# Patient Record
Sex: FEMALE | Race: WHITE | NOT HISPANIC OR LATINO | URBAN - METROPOLITAN AREA
[De-identification: names, ages, dates, MRNs, and addresses within clinical notes are randomized per-mention and may not be internally consistent; named-entity substitution may affect disease eponyms.]

---

## 2018-01-25 ENCOUNTER — EMERGENCY (EMERGENCY)
Facility: HOSPITAL | Age: 38
LOS: 1 days | Discharge: HOME | End: 2018-01-25
Attending: EMERGENCY MEDICINE | Admitting: EMERGENCY MEDICINE
Payer: MEDICAID

## 2018-01-25 VITALS
TEMPERATURE: 98 F | OXYGEN SATURATION: 98 % | SYSTOLIC BLOOD PRESSURE: 135 MMHG | HEART RATE: 77 BPM | RESPIRATION RATE: 18 BRPM | DIASTOLIC BLOOD PRESSURE: 88 MMHG

## 2018-01-25 DIAGNOSIS — R21 RASH AND OTHER NONSPECIFIC SKIN ERUPTION: ICD-10-CM

## 2018-01-25 DIAGNOSIS — L01.00 IMPETIGO, UNSPECIFIED: ICD-10-CM

## 2018-01-25 PROCEDURE — 99283 EMERGENCY DEPT VISIT LOW MDM: CPT | Mod: 25

## 2018-01-25 RX ORDER — HYDROXYZINE HCL 10 MG
2 TABLET ORAL
Qty: 30 | Refills: 0
Start: 2018-01-25 | End: 2018-01-29

## 2018-01-25 RX ORDER — VALACYCLOVIR 500 MG/1
1000 TABLET, FILM COATED ORAL ONCE
Refills: 0 | Status: COMPLETED | OUTPATIENT
Start: 2018-01-25 | End: 2018-01-25

## 2018-01-25 RX ORDER — VALACYCLOVIR 500 MG/1
1 TABLET, FILM COATED ORAL
Qty: 42 | Refills: 0
Start: 2018-01-25 | End: 2018-02-07

## 2018-01-25 RX ORDER — MUPIROCIN 20 MG/G
1 OINTMENT TOPICAL
Qty: 22 | Refills: 1
Start: 2018-01-25 | End: 2018-02-21

## 2018-01-25 RX ORDER — HYDROXYZINE HCL 10 MG
50 TABLET ORAL ONCE
Refills: 0 | Status: COMPLETED | OUTPATIENT
Start: 2018-01-25 | End: 2018-01-25

## 2018-01-25 RX ADMIN — Medication 1 TABLET(S): at 01:47

## 2018-01-25 RX ADMIN — Medication 60 MILLIGRAM(S): at 01:47

## 2018-01-25 RX ADMIN — Medication 50 MILLIGRAM(S): at 01:48

## 2018-01-25 RX ADMIN — VALACYCLOVIR 1000 MILLIGRAM(S): 500 TABLET, FILM COATED ORAL at 01:47

## 2018-01-25 NOTE — ED PROVIDER NOTE - CONSTITUTIONAL, MLM
Well appearing, well nourished, awake, alert, oriented to person, place, time/situation and itching face. normal...

## 2018-01-25 NOTE — ED PROVIDER NOTE - MEDICAL DECISION MAKING DETAILS
Patient presenting with V1 Zoster that is impetiginized. Will Rx Valtrex and Bactrim/bactroban. No visual sx.

## 2018-01-25 NOTE — ED PROVIDER NOTE - OBJECTIVE STATEMENT
38 F with painful itchy rash to R forehead and scalp. x 2d. Had a bad URI/poss Flu last week. No f/c. No eye sx.

## 2018-01-25 NOTE — ED ADULT TRIAGE NOTE - CHIEF COMPLAINT QUOTE
Pt. c/o rash to face and scalp. Rash is raised and scabbed in some spots. Pt. reports rash is very itchy.

## 2021-06-30 ENCOUNTER — EMERGENCY (EMERGENCY)
Facility: HOSPITAL | Age: 41
LOS: 1 days | Discharge: HOME | End: 2021-06-30
Attending: EMERGENCY MEDICINE | Admitting: EMERGENCY MEDICINE
Payer: COMMERCIAL

## 2021-06-30 VITALS
WEIGHT: 167.99 LBS | DIASTOLIC BLOOD PRESSURE: 71 MMHG | SYSTOLIC BLOOD PRESSURE: 103 MMHG | RESPIRATION RATE: 18 BRPM | OXYGEN SATURATION: 97 % | HEART RATE: 86 BPM | TEMPERATURE: 99 F | HEIGHT: 67 IN

## 2021-06-30 DIAGNOSIS — Z20.822 CONTACT WITH AND (SUSPECTED) EXPOSURE TO COVID-19: ICD-10-CM

## 2021-06-30 DIAGNOSIS — R07.0 PAIN IN THROAT: ICD-10-CM

## 2021-06-30 DIAGNOSIS — R05 COUGH: ICD-10-CM

## 2021-06-30 PROCEDURE — 70360 X-RAY EXAM OF NECK: CPT | Mod: 26

## 2021-06-30 PROCEDURE — 71046 X-RAY EXAM CHEST 2 VIEWS: CPT | Mod: 26

## 2021-06-30 PROCEDURE — 99284 EMERGENCY DEPT VISIT MOD MDM: CPT

## 2021-06-30 NOTE — ED ADULT NURSE NOTE - PAIN: BODY LOCATION
PRESCRIPTION REFILL POLICY [facility] [[email or web address]] Neurology [facility] Statement to Patients April 1, 2014 In an effort to ensure the large volume of patient prescription refills is processed in the most efficient and expeditious manner, we are asking our patients to assist us by calling your Pharmacy for all prescription refills, this will include also your  Mail Order Pharmacy. The pharmacy will contact our office electronically to continue the refill process. Please do not wait until the last minute to call your pharmacy. We need at least 48 hours (2days) to fill prescriptions. We also encourage you to call your pharmacy before going to  your prescription to make sure it is ready. With regard to controlled substance prescription refill requests (narcotic refills) that need to be picked up at our office, we ask your cooperation by providing us with at least 72 hours (3days) notice that you will need a refill. We will not refill narcotic prescription refill requests after 4:00pm on any weekday, Monday through Thursday, or after 2:00pm on Fridays, or on the weekends. We encourage everyone to explore another way of getting your prescription refill request processed using [email or web address], our patient web portal through our electronic medical record system. [email or web address] is an efficient and effective way to communicate your medication request directly to the office and  downloadable as an ivelisse on your smart phone . [email or web address] also features a review functionality that allows you to view your medication list as well as leave messages for your physician. Are you ready to get connected? If so please review the attatched instructions or speak to any of our staff to get you set up right away! Thank you so much for your cooperation. Should you have any questions please contact our Practice Administrator. The Physicians and Staff,  [facility] [email or web address]] Neurology [facility] sore throat

## 2021-06-30 NOTE — ED ADULT NURSE NOTE - OBJECTIVE STATEMENT
Pt came in c/o of cough x4 days. PT reports "hars cough that is making my throat hurt." Pt denies known sick contact. Denies fever, chills, sob, cp, n/v/d, pmh. A&Ox3 speaking in full sentences

## 2021-06-30 NOTE — ED ADULT TRIAGE NOTE - CHIEF COMPLAINT QUOTE
"Renee been violently coughing for 3 days and now my throat hurts" went to pcp given inhaler and looked in throat given a numbing med to swallow with nil relief , nil pmmhx, nil meds, covid -ve today , took advil with in the hour, nil fevers

## 2021-06-30 NOTE — ED ADULT TRIAGE NOTE - BMI (KG/M2)
26.3 34 yo F with pmh of GERD c/o intermittent epigastric pain x 5 days.  Associated with elevated LFTs yesterday. Currently pain free. Abd soft, nt, nd. r/o biliary colic, low suspicion for acute cholecystitis. 36 yo F with pmh of GERD c/o intermittent epigastric pain x 5 days.  Associated with elevated LFTs yesterday. Currently pain free. Abd soft, nt, nd. r/o biliary colic, r/o gallstones, r/o pancreatitis, low suspicion for acute cholecystitis.

## 2021-07-01 LAB
FLUAV H1 2009 PAND RNA SPEC QL NAA+PROBE: SIGNIFICANT CHANGE UP
FLUAV H1 RNA SPEC QL NAA+PROBE: SIGNIFICANT CHANGE UP
FLUAV H3 RNA SPEC QL NAA+PROBE: SIGNIFICANT CHANGE UP
FLUAV SUBTYP SPEC NAA+PROBE: SIGNIFICANT CHANGE UP
FLUBV RNA SPEC QL NAA+PROBE: SIGNIFICANT CHANGE UP
RAPID RVP RESULT: SIGNIFICANT CHANGE UP
S PYO AG SPEC QL IA: NEGATIVE — SIGNIFICANT CHANGE UP
SARS-COV-2 RNA SPEC QL NAA+PROBE: SIGNIFICANT CHANGE UP

## 2021-07-01 RX ORDER — HYDROCODONE BITARTRATE AND HOMATROPINE METHYLBROMIDE 5; 1.5 MG/5ML; MG/5ML
5 SOLUTION ORAL
Qty: 75 | Refills: 0
Start: 2021-07-01 | End: 2021-07-05

## 2021-07-01 NOTE — ED PROVIDER NOTE - PHYSICAL EXAMINATION
Physical Exam  GEN: Awake, alert, non-toxic appearing, NCAT  EYES: full EOMI, perrl  ENT: oropharynx patent w/o swelling/erythema/exudate/petechiae, midline uvula w/o swelling, no stridor, no cridoid tenderness, no neck musculature tenderness, bl TM clear w/o effusion, bl canal w/o swelling/erythema, no mastoid tenderness  HEAD: atraumatic  NECK: FROM neck w/o pain, nontender neck musculature, no crepitus, supple,   CV: rrr  RESP: cta bl, no tachypnea, no hypoxia, no resp distress,  MSK: card x 4, no chest wall crepitus  SKIN: no facial rash

## 2021-07-01 NOTE — ED PROVIDER NOTE - PATIENT PORTAL LINK FT
You can access the FollowMyHealth Patient Portal offered by [facility] by registering at the following website: [email or web address]. By joining [employer]’s FollowMyHealth portal, you will also be able to view your health information using other applications (apps) compatible with our system.

## 2021-07-01 NOTE — ED PROVIDER NOTE - PROGRESS NOTE DETAILS
d/w pt regarding d-dimer for low risk PE evaluation, pt does not want to draw blood, understands rationale of checking d-dimer and the purpose of the blood test, understands risks of undiagnosed PE, which can lead to death, and/or permanent/temporary disability.  pt wants to go home and see if she improves, will return if not better.  strict return precautions discussed.  ao x 3, clear and coherent speech, appears to have capacity to make decisions.

## 2021-07-01 NOTE — ED PROVIDER NOTE - NSFOLLOWUPINSTRUCTIONS_ED_ALL_ED_FT
Follow up with your primary care doctor   Take tessalon perles for cough  Take hycodan for severe cough  Hycodan can cause drowsiness  Return immediately for any new or worsening symptoms or any new concerns

## 2021-07-01 NOTE — ED PROVIDER NOTE - CLINICAL SUMMARY MEDICAL DECISION MAKING FREE TEXT BOX
nontoxic appearing pt w/ cough x 3 days, preceded by "tickle" in throat, no neck pain, no neck tenderness, no oropharyngeal swelling/erythema/exudate, will check strep throat, rvp, xray chest and throat given cough+pain, no crepitus on exam, antitussive prn, low suspicion for rpa or deep space infection,

## 2021-07-01 NOTE — ED PROVIDER NOTE - OBJECTIVE STATEMENT
41 yof pw cough x 3-4 days.  pt initially noted a "tickle" in her throat last Monday, then felt like she was fatigued/malaise, then cough began.  no unilateral leg pain/swelling, no immobilization/travel/surg/trauma, no exogenous hormone use, no hx of PE/DVT, no FHx of PE/DVT.  reports throat pain today after coughing, no pain w/ neck movement, also R ear pain after coughing.  no cp/pleurisy.

## 2021-07-02 LAB
CULTURE RESULTS: SIGNIFICANT CHANGE UP
SPECIMEN SOURCE: SIGNIFICANT CHANGE UP

## 2021-07-13 ENCOUNTER — EMERGENCY (EMERGENCY)
Facility: HOSPITAL | Age: 41
LOS: 1 days | Discharge: HOME | End: 2021-07-13
Admitting: EMERGENCY MEDICINE
Payer: COMMERCIAL

## 2021-07-13 VITALS
RESPIRATION RATE: 16 BRPM | HEIGHT: 68 IN | SYSTOLIC BLOOD PRESSURE: 125 MMHG | OXYGEN SATURATION: 96 % | HEART RATE: 110 BPM | WEIGHT: 179.9 LBS | TEMPERATURE: 98 F | DIASTOLIC BLOOD PRESSURE: 85 MMHG

## 2021-07-13 VITALS
DIASTOLIC BLOOD PRESSURE: 88 MMHG | RESPIRATION RATE: 18 BRPM | TEMPERATURE: 99 F | HEART RATE: 103 BPM | SYSTOLIC BLOOD PRESSURE: 134 MMHG | OXYGEN SATURATION: 97 %

## 2021-07-13 DIAGNOSIS — J35.1 HYPERTROPHY OF TONSILS: ICD-10-CM

## 2021-07-13 DIAGNOSIS — Z20.822 CONTACT WITH AND (SUSPECTED) EXPOSURE TO COVID-19: ICD-10-CM

## 2021-07-13 DIAGNOSIS — J06.9 ACUTE UPPER RESPIRATORY INFECTION, UNSPECIFIED: ICD-10-CM

## 2021-07-13 DIAGNOSIS — R50.9 FEVER, UNSPECIFIED: ICD-10-CM

## 2021-07-13 DIAGNOSIS — R59.9 ENLARGED LYMPH NODES, UNSPECIFIED: ICD-10-CM

## 2021-07-13 DIAGNOSIS — R05 COUGH: ICD-10-CM

## 2021-07-13 LAB
ALBUMIN SERPL ELPH-MCNC: 3.6 G/DL — SIGNIFICANT CHANGE UP (ref 3.4–5)
ALP SERPL-CCNC: 148 U/L — HIGH (ref 40–120)
ALT FLD-CCNC: 76 U/L — HIGH (ref 12–42)
ANION GAP SERPL CALC-SCNC: 8 MMOL/L — LOW (ref 9–16)
AST SERPL-CCNC: 55 U/L — HIGH (ref 15–37)
BASOPHILS # BLD AUTO: 0.04 K/UL — SIGNIFICANT CHANGE UP (ref 0–0.2)
BASOPHILS NFR BLD AUTO: 0.3 % — SIGNIFICANT CHANGE UP (ref 0–2)
BILIRUB SERPL-MCNC: 0.4 MG/DL — SIGNIFICANT CHANGE UP (ref 0.2–1.2)
BUN SERPL-MCNC: 8 MG/DL — SIGNIFICANT CHANGE UP (ref 7–23)
CALCIUM SERPL-MCNC: 9 MG/DL — SIGNIFICANT CHANGE UP (ref 8.5–10.5)
CHLORIDE SERPL-SCNC: 106 MMOL/L — SIGNIFICANT CHANGE UP (ref 96–108)
CO2 SERPL-SCNC: 25 MMOL/L — SIGNIFICANT CHANGE UP (ref 22–31)
CREAT SERPL-MCNC: 0.83 MG/DL — SIGNIFICANT CHANGE UP (ref 0.5–1.3)
D DIMER BLD IA.RAPID-MCNC: <187 NG/ML DDU — SIGNIFICANT CHANGE UP
EOSINOPHIL # BLD AUTO: 0.06 K/UL — SIGNIFICANT CHANGE UP (ref 0–0.5)
EOSINOPHIL NFR BLD AUTO: 0.5 % — SIGNIFICANT CHANGE UP (ref 0–6)
FLUAV H1 2009 PAND RNA SPEC QL NAA+PROBE: SIGNIFICANT CHANGE UP
FLUAV H1 RNA SPEC QL NAA+PROBE: SIGNIFICANT CHANGE UP
FLUAV H3 RNA SPEC QL NAA+PROBE: SIGNIFICANT CHANGE UP
FLUAV SUBTYP SPEC NAA+PROBE: SIGNIFICANT CHANGE UP
FLUBV RNA SPEC QL NAA+PROBE: SIGNIFICANT CHANGE UP
GLUCOSE SERPL-MCNC: 113 MG/DL — HIGH (ref 70–99)
HCG SERPL-ACNC: <1 MIU/ML — SIGNIFICANT CHANGE UP
HCT VFR BLD CALC: 41.6 % — SIGNIFICANT CHANGE UP (ref 34.5–45)
HGB BLD-MCNC: 14.1 G/DL — SIGNIFICANT CHANGE UP (ref 11.5–15.5)
IMM GRANULOCYTES NFR BLD AUTO: 0.4 % — SIGNIFICANT CHANGE UP (ref 0–1.5)
LACTATE SERPL-SCNC: 0.7 MMOL/L — SIGNIFICANT CHANGE UP (ref 0.4–2)
LYMPHOCYTES # BLD AUTO: 1.41 K/UL — SIGNIFICANT CHANGE UP (ref 1–3.3)
LYMPHOCYTES # BLD AUTO: 12.1 % — LOW (ref 13–44)
MAGNESIUM SERPL-MCNC: 2 MG/DL — SIGNIFICANT CHANGE UP (ref 1.6–2.6)
MCHC RBC-ENTMCNC: 31.5 PG — SIGNIFICANT CHANGE UP (ref 27–34)
MCHC RBC-ENTMCNC: 33.9 GM/DL — SIGNIFICANT CHANGE UP (ref 32–36)
MCV RBC AUTO: 92.9 FL — SIGNIFICANT CHANGE UP (ref 80–100)
MONOCYTES # BLD AUTO: 1.03 K/UL — HIGH (ref 0–0.9)
MONOCYTES NFR BLD AUTO: 8.8 % — SIGNIFICANT CHANGE UP (ref 2–14)
NEUTROPHILS # BLD AUTO: 9.08 K/UL — HIGH (ref 1.8–7.4)
NEUTROPHILS NFR BLD AUTO: 77.9 % — HIGH (ref 43–77)
NRBC # BLD: 0 /100 WBCS — SIGNIFICANT CHANGE UP (ref 0–0)
PHOSPHATE SERPL-MCNC: 2.6 MG/DL — SIGNIFICANT CHANGE UP (ref 2.5–4.5)
PLATELET # BLD AUTO: 350 K/UL — SIGNIFICANT CHANGE UP (ref 150–400)
POTASSIUM SERPL-MCNC: 4.5 MMOL/L — SIGNIFICANT CHANGE UP (ref 3.5–5.3)
POTASSIUM SERPL-SCNC: 4.5 MMOL/L — SIGNIFICANT CHANGE UP (ref 3.5–5.3)
PROT SERPL-MCNC: 7.5 G/DL — SIGNIFICANT CHANGE UP (ref 6.4–8.2)
RAPID RVP RESULT: SIGNIFICANT CHANGE UP
RBC # BLD: 4.48 M/UL — SIGNIFICANT CHANGE UP (ref 3.8–5.2)
RBC # FLD: 13.2 % — SIGNIFICANT CHANGE UP (ref 10.3–14.5)
S PYO AG SPEC QL IA: NEGATIVE — SIGNIFICANT CHANGE UP
SARS-COV-2 RNA SPEC QL NAA+PROBE: SIGNIFICANT CHANGE UP
SODIUM SERPL-SCNC: 139 MMOL/L — SIGNIFICANT CHANGE UP (ref 132–145)
TROPONIN I SERPL-MCNC: <0.017 NG/ML — LOW (ref 0.02–0.06)
WBC # BLD: 11.67 K/UL — HIGH (ref 3.8–10.5)
WBC # FLD AUTO: 11.67 K/UL — HIGH (ref 3.8–10.5)

## 2021-07-13 PROCEDURE — 99284 EMERGENCY DEPT VISIT MOD MDM: CPT

## 2021-07-13 PROCEDURE — 71046 X-RAY EXAM CHEST 2 VIEWS: CPT | Mod: 26

## 2021-07-13 RX ORDER — SODIUM CHLORIDE 9 MG/ML
1000 INJECTION INTRAMUSCULAR; INTRAVENOUS; SUBCUTANEOUS ONCE
Refills: 0 | Status: COMPLETED | OUTPATIENT
Start: 2021-07-13 | End: 2021-07-13

## 2021-07-13 RX ORDER — DEXAMETHASONE 0.5 MG/5ML
6 ELIXIR ORAL ONCE
Refills: 0 | Status: COMPLETED | OUTPATIENT
Start: 2021-07-13 | End: 2021-07-13

## 2021-07-13 RX ORDER — KETOROLAC TROMETHAMINE 30 MG/ML
15 SYRINGE (ML) INJECTION ONCE
Refills: 0 | Status: DISCONTINUED | OUTPATIENT
Start: 2021-07-13 | End: 2021-07-13

## 2021-07-13 RX ADMIN — Medication 15 MILLIGRAM(S): at 10:03

## 2021-07-13 RX ADMIN — Medication 1 MILLIGRAM(S): at 10:02

## 2021-07-13 RX ADMIN — Medication 6 MILLIGRAM(S): at 10:04

## 2021-07-13 RX ADMIN — SODIUM CHLORIDE 1000 MILLILITER(S): 9 INJECTION INTRAMUSCULAR; INTRAVENOUS; SUBCUTANEOUS at 10:04

## 2021-07-13 NOTE — ED PROVIDER NOTE - WR INTERPRETATION 1
CXR negative - No pneumothorax, No opacities, No free airCXR negative - No infiltrates, No consolidation, No atelectasis seenCXR negative - No tracheal deviation, No pneumothorax, No subdiaphragmatic

## 2021-07-13 NOTE — ED ADULT NURSE NOTE - OBJECTIVE STATEMENT
Pt is a 41y female complaining of sore throat, cough, and back pain x4 weeks. Pt states that she was seen in ED when symptoms started. Pt states this morning she has a fever of 99.3.

## 2021-07-13 NOTE — ED PROVIDER NOTE - ENMT, MLM
Airway patent, +bilateral tonsil edema and exudates, no kissing tonsils, uvula midline, no vesicles. +mild cervical lymphadenopathy. TMs clear bilaterally with normal markings; no erythema, bulging or rupture.

## 2021-07-13 NOTE — ED PROVIDER NOTE - CARE PLAN
Principal Discharge DX:	Fever, unspecified fever cause  Secondary Diagnosis:	Viral upper respiratory tract infection

## 2021-07-13 NOTE — ED PROVIDER NOTE - PATIENT PORTAL LINK FT
You can access the FollowMyHealth Patient Portal offered by [facility] by registering at the following website: [email or web address]. By joining [employer]’s FollowMyHealth portal, you will also be able to view your health information using other applications (apps) compatible with our system.

## 2021-07-13 NOTE — ED PROVIDER NOTE - OBJECTIVE STATEMENT
40 yo F presents to the ED c/o cough, sore throat, low grade fever, body aches and r sided back pain x 2-3 weeks. Pt was seen in the ED when symptoms started; negative strep and negative CXR. Pt was given tessalon perles and Tylenol with codeine for cough with initial improvement over the last week. Pt report worsening increased pain with swallowing with resurgence of low grade fever x 2 days. Pt notes she originally had pain to R ear with associated decreased hearing on the R side; Pt notes R ear symptoms have since improve. Pt denies headache, neck pain, abdominal pain, N/V/D, chest pain. Pt was not treated with antibiotics; Pt is fully vaccinated against COVID and flu.

## 2021-07-13 NOTE — ED ADULT NURSE NOTE - NSIMPLEMENTINTERV_GEN_ALL_ED
Implemented All Universal Safety Interventions:  [place] to call system. Call bell, personal items and telephone within reach. Instruct patient to call for assistance. Room bathroom lighting operational. Non-slip footwear when patient is off stretcher. Physically safe environment: no spills, clutter or unnecessary equipment. Stretcher in lowest position, wheels locked, appropriate side rails in place.

## 2021-07-13 NOTE — ED PROVIDER NOTE - PROGRESS NOTE DETAILS
rechecked pt - discussed all results and plan. symptoms resolved after toradol, fluids, decadron. will d/c. pt has a scheduled flight in 2-3 days. will give a note to not fly given fever./

## 2021-07-13 NOTE — ED PROVIDER NOTE - CLINICAL SUMMARY MEDICAL DECISION MAKING FREE TEXT BOX
pt presents with persistent URI. pt presents with persistent URI with some right sided back pain and low grade fever. tachy on arrival. will obtain labs including cultures, ekg, cxr, d dimer and cardiac enzymes.     labs unremarkable. ekg nonacute. cxr shows no pathology. strep negative. symptoms resolved after toradol, fluids, and decadron. will d/c.

## 2021-07-15 LAB
CULTURE RESULTS: SIGNIFICANT CHANGE UP
SPECIMEN SOURCE: SIGNIFICANT CHANGE UP

## 2021-07-16 LAB — HETEROPH AB TITR SER AGGL: NEGATIVE — SIGNIFICANT CHANGE UP

## 2022-02-03 NOTE — ED ADULT NURSE NOTE - NSFALLRSKHARMRISK_ED_ALL_ED
Post-Op Assessment Note    CV Status:  Stable    Pain management: adequate     Mental Status:  Alert and awake   Hydration Status:  Euvolemic   PONV Controlled:  Controlled   Airway Patency:  Patent      Post Op Vitals Reviewed: Yes      Staff: CRNA         No complications documented      BP   118/75   Temp  98   Pulse  90   Resp   16   SpO2   97% no

## 2022-04-22 ENCOUNTER — EMERGENCY (EMERGENCY)
Facility: HOSPITAL | Age: 42
LOS: 1 days | Discharge: HOME | End: 2022-04-22
Admitting: EMERGENCY MEDICINE
Payer: COMMERCIAL

## 2022-04-22 VITALS
DIASTOLIC BLOOD PRESSURE: 95 MMHG | TEMPERATURE: 98 F | WEIGHT: 149.91 LBS | HEART RATE: 102 BPM | SYSTOLIC BLOOD PRESSURE: 137 MMHG | HEIGHT: 68 IN | OXYGEN SATURATION: 99 % | RESPIRATION RATE: 18 BRPM

## 2022-04-22 DIAGNOSIS — S86.911A STRAIN OF UNSPECIFIED MUSCLE(S) AND TENDON(S) AT LOWER LEG LEVEL, RIGHT LEG, INITIAL ENCOUNTER: ICD-10-CM

## 2022-04-22 DIAGNOSIS — M79.661 PAIN IN RIGHT LOWER LEG: ICD-10-CM
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
## 2022-04-22 PROCEDURE — 99284 EMERGENCY DEPT VISIT MOD MDM: CPT

## 2022-04-22 PROCEDURE — 73590 X-RAY EXAM OF LOWER LEG: CPT | Mod: 26,RT

## 2022-04-22 PROCEDURE — 76882 US LMTD JT/FCL EVL NVASC XTR: CPT | Mod: 26,RT

## 2022-04-22 NOTE — ED PROVIDER NOTE - PATIENT PORTAL LINK FT
You can access the FollowMyHealth Patient Portal offered by [facility] by registering at the following website: [email or web address]. By joining [employer]’s FollowMyHealth portal, you will also be able to view your health information using other applications (apps) compatible with our system.

## 2022-04-22 NOTE — ED ADULT TRIAGE NOTE - CHIEF COMPLAINT QUOTE
pt c/o right ankle/heel pain x 2 days, felt a snap while reaching up for something in a cabinet; is able to walk with heel elevated

## 2022-04-22 NOTE — ED PROVIDER NOTE - PROGRESS NOTE DETAILS
discussed results and plan with pt. cam boot applied with heel lift called Dr. Walker who agrees with plan for cam boot in plantar flexion and will f/u in the office.

## 2022-04-22 NOTE — ED ADULT NURSE NOTE - OBJECTIVE STATEMENT
Pt presents to ED complaining of two days of L calf/ankle pain while on tip toes reaching for something. Pt endorses pain with plantar flexion of ankle denies pain with dorsiflexion. Calf appears mildly swollen sans ecchymosis.

## 2022-04-22 NOTE — ED PROVIDER NOTE - NSFOLLOWUPINSTRUCTIONS_ED_ALL_ED_FT
Medial Head Gastrocnemius Tear       Medial head gastrocnemius tear, also called tennis leg, is an injury to the inner part of the calf muscle. This injury may include overstretching of the muscle or a partial or complete tear. This is a common sports injury. Calf muscle tears usually occur near the back of the knee. This often causes sudden pain and muscle weakness.      What are the causes?    This condition is caused by forceful stretching or strain on the calf muscle. This usually happens when you forcefully push off of your foot. It may also happen if you forcefully straighten your knee while your foot is flat on the ground.      What increases the risk?    The following factors may make you more likely to develop this condition:  •Being male and older than age 40.    •Playing sports that involve:  •Quick increases in speed and changes of direction, such as tennis and soccer.      •Jumping, such as basketball.      •Running, especially uphill or on uneven ground.          What are the signs or symptoms?    Symptoms of this condition include:  •Sudden pain in the back of the leg. You may hear a noise, like a pop or a snap at the time of injury.      •Pain that gets worse when you bring your toes up toward your shin or when you straighten your knee.      •Pain on the inside of your calf, from your knee to your ankle.      •Pain when pressing on your calf muscle.      •Swelling and bruising along your calf and lower leg, down to your ankle. This may worsen for the first 2 days before getting better.      •Not being able to rise up on your toes.      •Difficulty pushing off your foot when walking or using stairs.        How is this diagnosed?    This condition may be diagnosed based on:  •Your symptoms and medical history.      •A physical exam. Your health care provider may be able to feel a lump or a defect in your muscle.      •An MRI or ultrasound to determine the severity and exact location of your injury.        How is this treated?    Treatment for this condition may include:  •Resting the muscle and keeping weight off your leg for several days. During this time, you may use crutches or another walking device.      •Using a splint to keep your ankle or knee in a stable position.      •Wearing a walking boot to decrease the use of your gastrocnemius muscle.      •Using a wedge under your heel to reduce stretching of your healing muscle.      •Wearing a compression sleeve around your calf muscle.      •Icing the muscle.      •Raising (elevating) your leg when resting.      •Taking medicine for pain and swelling, such as NSAIDs or steroids.      •Taking medicine for muscle spasms.      •Doing leg exercises as told by your health care provider or physical therapist.        Follow these instructions at home:    Medicines     •Take over-the-counter and prescription medicines only as told by your health care provider.      •Ask your health care provider if the medicine prescribed to you requires you to avoid driving or using heavy machinery.      •Talk with your health care provider before you take any medicines that contain aspirin. Aspirin increases your risk for bleeding at the injured area.      If you have a splint, boot, or compression sleeve:     •Wear it as told by your health care provider. Remove it only as told by your health care provider.      •Loosen the splint, boot, or sleeve if your toes tingle, become numb, or turn cold and blue.      •Keep the splint, boot, or sleeve clean.    •If the splint, boot, or sleeve is not waterproof:  •Do not let it get wet.      •Cover it with a watertight covering when you take a bath or shower.          Managing pain, stiffness, and swelling    •If directed, put ice on the injured area.  •If you have a removable splint, boot, or sleeve, remove it as told by your health care provider.      •Put ice in a plastic bag.      •Place a towel between your skin and the bag.      •Leave the ice on for 20 minutes, 2–3 times a day.        •Move your toes often to reduce stiffness and swelling.      •Elevate the injured area above the level of your heart while you are sitting or lying down.      Activity     •Return gradually to your normal activities as told by your health care provider. Ask your health care provider what activities are safe for you.      • Do not use the injured limb to support your body weight until your health care provider says that you can. Use crutches as told by your health care provider.      •Do exercises as told by your health care provider.      •Return to sporting activity only as told by your health care provider or physical therapist. Full recovery may take several months.      General instructions     •Ask your health care provider when it is safe to drive.      • Do not use any products that contain nicotine or tobacco, such as cigarettes, e-cigarettes, and chewing tobacco. If you need help quitting, ask your health care provider.      •Keep all follow-up visits as told by your health care provider. This is important.        How is this prevented?    •Warm up and stretch before being active.      •Cool down and stretch after being active.      •Give your body time to rest between periods of activity.      •Make sure to use equipment that fits you.      •Be safe and responsible while being active to avoid falls.    •Maintain physical fitness, including:  •Strength.      •Flexibility.          Contact a health care provider if:    •Your symptoms do not improve with rest and treatment.        Get help right away if:    •You have swelling or redness in your calf that is getting worse.      •Your skin or toenails turn blue or gray, feel cold, or become numb.        Summary    •Medial head gastrocnemius tear, also called tennis leg, is an injury to the inner part of the calf muscle.      •Follow instructions as told by your health care provider for resting, icing, compressing, and elevating your leg.      •Take over-the-counter and prescription medicines only as told by your health care provider.      •Contact a health care provider if your symptoms do not improve with rest and treatment.      This information is not intended to replace advice given to you by your health care provider. Make sure you discuss any questions you have with your health care provider.

## 2022-04-22 NOTE — ED PROVIDER NOTE - CLINICAL SUMMARY MEDICAL DECISION MAKING FREE TEXT BOX
pt presents with right calf pain x 2 days after snapping sensation while contracted.  exam with point tenderness to medial gastrocnemius with mild weakness of  plantar flexion. suspect partial tear. US and xray negative.  d/w ortho, Dr. Walker who will f/u with pt.

## 2022-04-22 NOTE — ED PROVIDER NOTE - OBJECTIVE STATEMENT
43yo F presents with c/o right calf discomfort x 2 days. states she was stretching up to get something off the top shelf when she felt a sudden snap as if she had been struck with something on the back of her calf. pain originally radiated up the back of her thigh but that pain has improved. notes that she bear weight but only when her foot is plantarflexed in a high heeled boot. denies numbness, tingling, swelling, cp, sob. denies direct trauma.

## 2022-04-22 NOTE — ED PROVIDER NOTE - CARE PROVIDER_API CALL
Odilon Walker)  Orthopaedic Surgery; Sports Medicine  [address] [place] [address]  Phone: [phone]  Fax: [phone]  Follow Up Time:

## 2022-04-22 NOTE — ED PROVIDER NOTE - PHYSICAL EXAMINATION
Constitutional: Well appearing, awake, alert, oriented to person, place, time/situation and in no apparent distress.  HEENT: Airway patent, NCAT  Resp: no conversational dyspnea, tachypnea, or signs of respiratory distress  Msk: + tenderness to medial right calf, no tenderness over achilles tendon, full AROM of the ankle with 2+ dp/pt pulses, no edema or redness or ecchymosis  Neuro: A&Ox3   Skin: no redness, edema, or ecchymosis to extremities Constitutional: Well appearing, awake, alert, oriented to person, place, time/situation and in no apparent distress.  HEENT: Airway patent, NCAT  Resp: no conversational dyspnea, tachypnea, or signs of respiratory distress  Msk: + tenderness to medial right calf, no tenderness over achilles tendon, full AROM of the ankle but pain and mild weakness with plantar flexion, with 2+ dp/pt pulses, no edema or redness or ecchymosis  Neuro: A&Ox3   Skin: no redness, edema, or ecchymosis to extremities

## 2023-12-06 NOTE — ED PROVIDER NOTE - CHPI ED SYMPTOMS NEG
Droplet+Contact precautions no headache, no neck pain, no abdominal pain, no N/V/D, no chest pain/no vomiting

## 2024-10-04 NOTE — ED ADULT NURSE NOTE - PRIMARY CARE PROVIDER
[FreeTextEntry1] : The patient delivered a healthy baby boy  by  section. Her last month and a half of pregnancy she had gestational diabetes. She had borderline preeclampsia and was placed on labetalol 200 mg daily. She's been taking labetalol for the past 6 weeks. She denies any chest pains, shortness of breath, or palpitations. She has not felt any dizziness but she does suffer from sleep deprivation because the new baby wakes up frequently. May 9, 2019: Patient has no new medical complaints. She denies any chest pains, shortness of breath, or palpitations. Her baby is now 7 months old and is doing well. 2020: The patient feels tired. In fact she describes it as feeling exhausted.  She only gets 6 hours of interrupted sleep.  She has a 15-month-old baby at home.  She usually goes to sleep around 10 and usually wakes up at about 4 AM.  She has been taking her labetalol when she goes to sleep at 10 PM and when she wakes up at 4 or 4:30PM am. 2020: Patient is worried that she is premenopausal or perimenopausal.  She has an appointment with her OB/GYN in a couple of weeks.  She denies any chest pains, shortness of breath, or palpitations.  She is under stress.  She lost a job and then got a new job.  She is working from home.  She has a 21-month-old at home. 2021: The patient has no new complaints.  She denies any chest pains, shortness of breath, or palpitations.  She is fully vaccinated against COVID-19.  She works from home.  She has a 2-1/2-year-old at home. 2022: The patient feels tired.  She went back to the office for the first time yesterday.  Then she went out to dinner.  She slept well.  She denies any chest pains, shortness of breath, or palpitations.  She has not yet taken her labetalol this morning.  She probably had salty food at dinner. 2022: The patient is tired.  She is now back at work in the office.  She has had international travel.  She denies any chest pains, shortness of breath, or palpitations. 10/23/2023 Kylie Fontana returns today for follow up of her hypertension. She has also recently established care with a new primary care provider, João Valladares MD, who she saw earlier today. For her hypertension she has been taking labetalol 200 mg twice daily. She is yet to begin the recommended valsartan as she has concerns about how to taper off the beta blocker. Otherwise today she is feeling well and in her usual state of health, stressed and fatigued. She has not been exercising as she is busy with her work and caring for her five year old son. With her usual activities she denies any chest discomfort, shortness of breath, palpitations, lightheadedness or syncope. unknown

## 2024-12-19 NOTE — ED ADULT NURSE NOTE - SKIN TEMPERATURE
DYMISTA NOT COVERED. CAN IT BE CHANGED TO FLUTICASONE PROPRIONATE OR AZELASTINE? THESE SUPPOSED LY COVERED BY INSURANCE  
warm